# Patient Record
Sex: MALE | Race: BLACK OR AFRICAN AMERICAN | Employment: UNEMPLOYED | URBAN - METROPOLITAN AREA
[De-identification: names, ages, dates, MRNs, and addresses within clinical notes are randomized per-mention and may not be internally consistent; named-entity substitution may affect disease eponyms.]

---

## 2023-05-31 PROCEDURE — 99283 EMERGENCY DEPT VISIT LOW MDM: CPT

## 2023-06-01 ENCOUNTER — HOSPITAL ENCOUNTER (EMERGENCY)
Age: 43
Discharge: HOME | End: 2023-06-01

## 2023-06-01 ENCOUNTER — APPOINTMENT (OUTPATIENT)
Dept: GENERAL RADIOLOGY | Age: 43
End: 2023-06-01

## 2023-06-01 VITALS
WEIGHT: 308 LBS | DIASTOLIC BLOOD PRESSURE: 118 MMHG | BODY MASS INDEX: 49.5 KG/M2 | RESPIRATION RATE: 15 BRPM | OXYGEN SATURATION: 98 % | SYSTOLIC BLOOD PRESSURE: 173 MMHG | HEIGHT: 66 IN | TEMPERATURE: 98.1 F | HEART RATE: 91 BPM

## 2023-06-01 DIAGNOSIS — J44.1 COPD EXACERBATION (HCC): Primary | ICD-10-CM

## 2023-06-01 PROCEDURE — 71045 X-RAY EXAM CHEST 1 VIEW: CPT

## 2023-06-01 RX ORDER — GUAIFENESIN 200 MG/10ML
200 LIQUID ORAL 3 TIMES DAILY PRN
Qty: 1 EACH | Refills: 0 | Status: SHIPPED | OUTPATIENT
Start: 2023-06-01

## 2023-06-01 RX ORDER — CODEINE PHOSPHATE AND GUAIFENESIN 10; 100 MG/5ML; MG/5ML
5 SOLUTION ORAL ONCE
Status: DISCONTINUED | OUTPATIENT
Start: 2023-06-01 | End: 2023-06-01 | Stop reason: HOSPADM

## 2023-06-01 RX ORDER — PREDNISONE 50 MG/1
50 TABLET ORAL DAILY
Qty: 5 TABLET | Refills: 0 | Status: SHIPPED | OUTPATIENT
Start: 2023-06-01 | End: 2023-06-06

## 2023-06-01 ASSESSMENT — PAIN - FUNCTIONAL ASSESSMENT: PAIN_FUNCTIONAL_ASSESSMENT: NONE - DENIES PAIN

## 2023-06-01 NOTE — ED PROVIDER NOTES
[address] [facility]  ED  Emergency Department Encounter    Patient Name: Dasha Fajardo  MRN: [id number]  [place]: 1980  Date of Evaluation: 5/31/2023  Provider: No primary care provider on file. Note Started: 1:52 AM EDT 6/1/23    CHIEF COMPLAINT  Other (States he has chronic bronchitis, been feeling sick and SOB)    SHARED SERVICE VISIT  Evaluated by LESIA. My supervising physician was available for consultation. HISTORY OF PRESENT ILLNESS  Dasha Fajardo is a 36 y.o. male who presents to the ED with several day history of cough and chest discomfort. Patient reports being dropped off friend for evaluation. Reports that he has a history of COPD. 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 States that he will be here for approximately another year and is originally from [place]. States that when he has COPD flares typically treated with prednisone and Tussionex which she is requesting. He reports chest discomfort and shortness of breath and coughing. Does appear worse at night. Patient denies any fevers or chills. No headaches or dizziness. No nasal congestion sore throat. Denies pain with deep breaths. Has had no leg pain or swelling. .. No other complaints, modifying factors or associated symptoms. Nursing notes reviewed were all reviewed and agreed with or any disagreements were addressed in the HPI. PMH:  No past medical history on file. Surgical History:  No past surgical history on file. Family History:  No family history on file.     Social History:  Social History     Socioeconomic History    Marital status: Single     Spouse name: Not on file    Number of children: Not on file    Years of education: Not on file    Highest education level: Not on file   Occupational History    Not on file   Tobacco Use    Smoking status: Not on file    Smokeless tobacco: Not on file   Substance

## 2023-06-01 NOTE — ED NOTES
Pt keeps coming out of room and demanding to see provider. Pt followed this writer to another pt's room and had to be reminded to stay in room. Pt refusing another set of vitals. Pt on the phone during triage arguing with family. Pt upset with diagnosis. Angry he had to wait for chest XR, pt stating he knows he has bronchitis and just wants his medications sent to pharmacy. Pt refused cough syrup and said it does not work, requesting another medication. Pt discharged, but pt demanding to see \"attending\". Writer explained that we could talk to attending but did not know how long it would be until he could see him. Pt demanding to know when attending will see him.       Karri Ybarra RN  06/01/23 [id number]